# Patient Record
Sex: FEMALE | Race: WHITE | NOT HISPANIC OR LATINO | Employment: FULL TIME | URBAN - METROPOLITAN AREA
[De-identification: names, ages, dates, MRNs, and addresses within clinical notes are randomized per-mention and may not be internally consistent; named-entity substitution may affect disease eponyms.]

---

## 2018-06-01 ENCOUNTER — HOSPITAL ENCOUNTER (OUTPATIENT)
Dept: URGENT CARE | Facility: CLINIC | Age: 23
Setting detail: SPECIMEN
Discharge: HOME | End: 2018-06-01
Attending: FAMILY MEDICINE | Admitting: FAMILY MEDICINE

## 2018-06-01 LAB
ALBUMIN SERPL-MCNC: 4.4 G/DL (ref 3.5–4.8)
ALBUMIN/GLOB SERPL: 2.3 {RATIO} (ref 1–1.7)
ALP SERPL-CCNC: 24 IU/L (ref 32–91)
ALT SERPL-CCNC: 16 IU/L (ref 14–54)
ANION GAP SERPL CALC-SCNC: 14.3 MMOL/L (ref 10–20)
AST SERPL-CCNC: 23 IU/L (ref 15–41)
BILIRUB SERPL-MCNC: 0.9 MG/DL (ref 0.3–1.2)
BUN SERPL-MCNC: 12 MG/DL (ref 8–20)
BUN/CREAT SERPL: 13.3 (ref 5.4–26.2)
CALCIUM SERPL-MCNC: 9.8 MG/DL (ref 8.9–10.3)
CHLORIDE SERPL-SCNC: 103 MMOL/L (ref 101–111)
CONV CO2: 24 MMOL/L (ref 22–32)
CONV TOTAL PROTEIN: 6.3 G/DL (ref 6.1–7.9)
CREAT UR-MCNC: 0.9 MG/DL (ref 0.4–1)
GLOBULIN UR ELPH-MCNC: 1.9 G/DL (ref 2.5–3.8)
GLUCOSE SERPL-MCNC: 108 MG/DL (ref 65–99)
POTASSIUM SERPL-SCNC: 4.3 MMOL/L (ref 3.6–5.1)
SODIUM SERPL-SCNC: 137 MMOL/L (ref 136–144)

## 2018-06-04 LAB — HAV IGM SERPL QL IA: NONREACTIVE

## 2019-09-22 RX ORDER — MONTELUKAST SODIUM 10 MG/1
TABLET ORAL
Qty: 90 TABLET | Refills: 0 | Status: SHIPPED | OUTPATIENT
Start: 2019-09-22 | End: 2019-12-17 | Stop reason: SDUPTHER

## 2019-12-17 RX ORDER — MONTELUKAST SODIUM 10 MG/1
TABLET ORAL
Qty: 90 TABLET | Refills: 0 | Status: SHIPPED | OUTPATIENT
Start: 2019-12-17

## 2020-03-16 RX ORDER — MONTELUKAST SODIUM 10 MG/1
TABLET ORAL
Qty: 90 TABLET | Refills: 0 | OUTPATIENT
Start: 2020-03-16

## 2020-03-22 RX ORDER — FLUTICASONE PROPIONATE 50 MCG
SPRAY, SUSPENSION (ML) NASAL
Qty: 48 G | Refills: 3 | Status: SHIPPED | OUTPATIENT
Start: 2020-03-22

## 2020-11-18 LAB
EXTERNAL ABO GROUPING: NORMAL
EXTERNAL ANTIBODY SCREEN: NEGATIVE
EXTERNAL HEPATITIS B SURFACE ANTIGEN: NEGATIVE
EXTERNAL HEPATITIS C AB: NORMAL
EXTERNAL RH FACTOR: POSITIVE
EXTERNAL RUBELLA QUALITATIVE: NORMAL
EXTERNAL SYPHILIS RPR SCREEN: NORMAL
HIV1 P24 AG SERPL QL IA: NORMAL

## 2021-06-24 LAB — EXTERNAL GROUP B STREP ANTIGEN: NEGATIVE

## 2021-07-18 ENCOUNTER — HOSPITAL ENCOUNTER (INPATIENT)
Facility: HOSPITAL | Age: 26
LOS: 4 days | Discharge: HOME | End: 2021-07-22
Attending: OBSTETRICS & GYNECOLOGY | Admitting: OBSTETRICS & GYNECOLOGY

## 2021-07-18 ENCOUNTER — HOSPITAL ENCOUNTER (OUTPATIENT)
Dept: LABOR AND DELIVERY | Facility: HOSPITAL | Age: 26
Discharge: HOME | End: 2021-07-18

## 2021-07-18 PROBLEM — Z34.90 PREGNANT: Status: ACTIVE | Noted: 2021-07-18

## 2021-07-18 LAB
ABO GROUP BLD: NORMAL
BLD GP AB SCN SERPL QL: NEGATIVE
DEPRECATED RDW RBC AUTO: 42 FL (ref 37–54)
ERYTHROCYTE [DISTWIDTH] IN BLOOD BY AUTOMATED COUNT: 16 % (ref 12.3–15.4)
HCT VFR BLD AUTO: 32.1 % (ref 34–46.6)
HGB BLD-MCNC: 10.6 G/DL (ref 12–15.9)
MCH RBC QN AUTO: 24.6 PG (ref 26.6–33)
MCHC RBC AUTO-ENTMCNC: 33 G/DL (ref 31.5–35.7)
MCV RBC AUTO: 74.5 FL (ref 79–97)
PLATELET # BLD AUTO: 188 10*3/MM3 (ref 140–450)
PMV BLD AUTO: 8 FL (ref 6–12)
RBC # BLD AUTO: 4.31 10*6/MM3 (ref 3.77–5.28)
RH BLD: POSITIVE
SARS-COV-2 RNA PNL SPEC NAA+PROBE: NOT DETECTED
T&S EXPIRATION DATE: NORMAL
WBC # BLD AUTO: 8.4 10*3/MM3 (ref 3.4–10.8)

## 2021-07-18 PROCEDURE — 86901 BLOOD TYPING SEROLOGIC RH(D): CPT | Performed by: OBSTETRICS & GYNECOLOGY

## 2021-07-18 PROCEDURE — 86900 BLOOD TYPING SEROLOGIC ABO: CPT

## 2021-07-18 PROCEDURE — 86850 RBC ANTIBODY SCREEN: CPT | Performed by: OBSTETRICS & GYNECOLOGY

## 2021-07-18 PROCEDURE — 86592 SYPHILIS TEST NON-TREP QUAL: CPT | Performed by: OBSTETRICS & GYNECOLOGY

## 2021-07-18 PROCEDURE — 86900 BLOOD TYPING SEROLOGIC ABO: CPT | Performed by: OBSTETRICS & GYNECOLOGY

## 2021-07-18 PROCEDURE — 85027 COMPLETE CBC AUTOMATED: CPT | Performed by: OBSTETRICS & GYNECOLOGY

## 2021-07-18 PROCEDURE — 86901 BLOOD TYPING SEROLOGIC RH(D): CPT

## 2021-07-18 PROCEDURE — 87635 SARS-COV-2 COVID-19 AMP PRB: CPT | Performed by: OBSTETRICS & GYNECOLOGY

## 2021-07-18 RX ORDER — SODIUM CHLORIDE, SODIUM LACTATE, POTASSIUM CHLORIDE, CALCIUM CHLORIDE 600; 310; 30; 20 MG/100ML; MG/100ML; MG/100ML; MG/100ML
125 INJECTION, SOLUTION INTRAVENOUS CONTINUOUS
Status: DISCONTINUED | OUTPATIENT
Start: 2021-07-18 | End: 2021-07-19

## 2021-07-18 RX ORDER — SODIUM CHLORIDE 0.9 % (FLUSH) 0.9 %
10 SYRINGE (ML) INJECTION EVERY 12 HOURS SCHEDULED
Status: DISCONTINUED | OUTPATIENT
Start: 2021-07-18 | End: 2021-07-19 | Stop reason: HOSPADM

## 2021-07-18 RX ORDER — MAGNESIUM CARB/ALUMINUM HYDROX 105-160MG
30 TABLET,CHEWABLE ORAL DAILY PRN
Status: DISCONTINUED | OUTPATIENT
Start: 2021-07-19 | End: 2021-07-19 | Stop reason: HOSPADM

## 2021-07-18 RX ORDER — LEVOMEFOLATE CALCIUM 15 MG
15 TABLET ORAL EVERY OTHER DAY
COMMUNITY
End: 2021-07-22 | Stop reason: HOSPADM

## 2021-07-18 RX ORDER — OXYTOCIN-SODIUM CHLORIDE 0.9% IV SOLN 30 UNIT/500ML 30-0.9/5 UT/ML-%
2 SOLUTION INTRAVENOUS
Status: DISCONTINUED | OUTPATIENT
Start: 2021-07-19 | End: 2021-07-19

## 2021-07-18 RX ORDER — LIDOCAINE HYDROCHLORIDE 10 MG/ML
30 INJECTION, SOLUTION INFILTRATION; PERINEURAL AS NEEDED
Status: DISCONTINUED | OUTPATIENT
Start: 2021-07-18 | End: 2021-07-19 | Stop reason: HOSPADM

## 2021-07-18 RX ORDER — PRENATAL VIT/IRON FUM/FOLIC AC 27MG-0.8MG
TABLET ORAL DAILY
Status: ON HOLD | COMMUNITY
End: 2021-07-18

## 2021-07-18 RX ORDER — MAGNESIUM CARB/ALUMINUM HYDROX 105-160MG
30 TABLET,CHEWABLE ORAL AS NEEDED
Status: DISCONTINUED | OUTPATIENT
Start: 2021-07-18 | End: 2021-07-18

## 2021-07-18 RX ORDER — MORPHINE SULFATE 4 MG/ML
4 INJECTION, SOLUTION INTRAMUSCULAR; INTRAVENOUS
Status: DISCONTINUED | OUTPATIENT
Start: 2021-07-18 | End: 2021-07-19 | Stop reason: HOSPADM

## 2021-07-18 RX ORDER — PRENATAL VIT NO.126/IRON/FOLIC 28MG-0.8MG
1 TABLET ORAL DAILY
COMMUNITY

## 2021-07-18 RX ORDER — ALBUTEROL SULFATE 90 UG/1
2 AEROSOL, METERED RESPIRATORY (INHALATION) AS NEEDED
COMMUNITY

## 2021-07-18 RX ORDER — MAGNESIUM CARB/ALUMINUM HYDROX 105-160MG
30 TABLET,CHEWABLE ORAL ONCE
Status: DISCONTINUED | OUTPATIENT
Start: 2021-07-18 | End: 2021-07-18

## 2021-07-18 RX ADMIN — Medication 10 ML: at 20:34

## 2021-07-18 RX ADMIN — DINOPROSTONE 10 MG: 10 INSERT VAGINAL at 20:00

## 2021-07-18 RX ADMIN — SODIUM CHLORIDE, POTASSIUM CHLORIDE, SODIUM LACTATE AND CALCIUM CHLORIDE 1000 ML: 600; 310; 30; 20 INJECTION, SOLUTION INTRAVENOUS at 19:27

## 2021-07-18 RX ADMIN — SODIUM CHLORIDE, POTASSIUM CHLORIDE, SODIUM LACTATE AND CALCIUM CHLORIDE 125 ML/HR: 600; 310; 30; 20 INJECTION, SOLUTION INTRAVENOUS at 23:06

## 2021-07-18 NOTE — PLAN OF CARE
Goal Outcome Evaluation:  Plan of Care Reviewed With: patient, spouse        Progress: no change  Outcome Summary: pt admitted for cervidil induction. cat1 fetal tracing.

## 2021-07-19 ENCOUNTER — ANESTHESIA EVENT (OUTPATIENT)
Dept: LABOR AND DELIVERY | Facility: HOSPITAL | Age: 26
End: 2021-07-19

## 2021-07-19 ENCOUNTER — ANESTHESIA (OUTPATIENT)
Dept: LABOR AND DELIVERY | Facility: HOSPITAL | Age: 26
End: 2021-07-19

## 2021-07-19 LAB — RPR SER QL: NORMAL

## 2021-07-19 PROCEDURE — 25010000002 ONDANSETRON PER 1 MG: Performed by: ANESTHESIOLOGY

## 2021-07-19 PROCEDURE — 88307 TISSUE EXAM BY PATHOLOGIST: CPT | Performed by: OBSTETRICS & GYNECOLOGY

## 2021-07-19 PROCEDURE — 25010000002 METOCLOPRAMIDE PER 10 MG: Performed by: ANESTHESIOLOGY

## 2021-07-19 PROCEDURE — 25010000002 FENTANYL CITRATE (PF) 250 MCG/5ML SOLUTION: Performed by: ANESTHESIOLOGY

## 2021-07-19 PROCEDURE — 25010000002 MORPHINE PER 10 MG: Performed by: ANESTHESIOLOGY

## 2021-07-19 PROCEDURE — 25010000002 PHENYLEPHRINE 10 MG/ML SOLUTION: Performed by: ANESTHESIOLOGY

## 2021-07-19 RX ORDER — DIPHENHYDRAMINE HYDROCHLORIDE 50 MG/ML
25 INJECTION INTRAMUSCULAR; INTRAVENOUS EVERY 4 HOURS PRN
Status: DISCONTINUED | OUTPATIENT
Start: 2021-07-19 | End: 2021-07-22 | Stop reason: HOSPADM

## 2021-07-19 RX ORDER — ONDANSETRON 2 MG/ML
4 INJECTION INTRAMUSCULAR; INTRAVENOUS ONCE AS NEEDED
Status: DISPENSED | OUTPATIENT
Start: 2021-07-19 | End: 2021-07-20

## 2021-07-19 RX ORDER — HYDROMORPHONE HCL 110MG/55ML
0.5 PATIENT CONTROLLED ANALGESIA SYRINGE INTRAVENOUS
Status: ACTIVE | OUTPATIENT
Start: 2021-07-19 | End: 2021-07-20

## 2021-07-19 RX ORDER — DIPHENHYDRAMINE HCL 25 MG
25 CAPSULE ORAL EVERY 4 HOURS PRN
Status: DISCONTINUED | OUTPATIENT
Start: 2021-07-19 | End: 2021-07-22 | Stop reason: HOSPADM

## 2021-07-19 RX ORDER — ONDANSETRON 4 MG/1
4 TABLET, FILM COATED ORAL EVERY 8 HOURS PRN
Status: DISCONTINUED | OUTPATIENT
Start: 2021-07-19 | End: 2021-07-22 | Stop reason: HOSPADM

## 2021-07-19 RX ORDER — LANOLIN 100 %
OINTMENT (GRAM) TOPICAL
Status: DISCONTINUED | OUTPATIENT
Start: 2021-07-19 | End: 2021-07-22 | Stop reason: HOSPADM

## 2021-07-19 RX ORDER — IBUPROFEN 600 MG/1
600 TABLET ORAL EVERY 6 HOURS PRN
Status: DISCONTINUED | OUTPATIENT
Start: 2021-07-19 | End: 2021-07-22 | Stop reason: HOSPADM

## 2021-07-19 RX ORDER — PRENATAL VIT/IRON FUM/FOLIC AC 27MG-0.8MG
1 TABLET ORAL DAILY
Status: DISCONTINUED | OUTPATIENT
Start: 2021-07-19 | End: 2021-07-22 | Stop reason: HOSPADM

## 2021-07-19 RX ORDER — CARBOPROST TROMETHAMINE 250 UG/ML
250 INJECTION, SOLUTION INTRAMUSCULAR AS NEEDED
Status: DISCONTINUED | OUTPATIENT
Start: 2021-07-19 | End: 2021-07-19 | Stop reason: HOSPADM

## 2021-07-19 RX ORDER — OXYTOCIN-SODIUM CHLORIDE 0.9% IV SOLN 30 UNIT/500ML 30-0.9/5 UT/ML-%
125 SOLUTION INTRAVENOUS CONTINUOUS PRN
Status: COMPLETED | OUTPATIENT
Start: 2021-07-19 | End: 2021-07-19

## 2021-07-19 RX ORDER — NALOXONE HCL 0.4 MG/ML
0.4 VIAL (ML) INJECTION
Status: ACTIVE | OUTPATIENT
Start: 2021-07-19 | End: 2021-07-20

## 2021-07-19 RX ORDER — OXYCODONE HYDROCHLORIDE 5 MG/1
5 TABLET ORAL EVERY 4 HOURS PRN
Status: DISCONTINUED | OUTPATIENT
Start: 2021-07-19 | End: 2021-07-22 | Stop reason: HOSPADM

## 2021-07-19 RX ORDER — SCOLOPAMINE TRANSDERMAL SYSTEM 1 MG/1
1 PATCH, EXTENDED RELEASE TRANSDERMAL
Status: DISCONTINUED | OUTPATIENT
Start: 2021-07-19 | End: 2021-07-22 | Stop reason: HOSPADM

## 2021-07-19 RX ORDER — OXYTOCIN-SODIUM CHLORIDE 0.9% IV SOLN 30 UNIT/500ML 30-0.9/5 UT/ML-%
2 SOLUTION INTRAVENOUS
Status: DISCONTINUED | OUTPATIENT
Start: 2021-07-19 | End: 2021-07-19

## 2021-07-19 RX ORDER — CLINDAMYCIN PHOSPHATE 900 MG/50ML
900 INJECTION, SOLUTION INTRAVENOUS ONCE
Status: COMPLETED | OUTPATIENT
Start: 2021-07-19 | End: 2021-07-19

## 2021-07-19 RX ORDER — CLINDAMYCIN PHOSPHATE 600 MG/50ML
600 INJECTION, SOLUTION INTRAVENOUS ONCE
Status: DISCONTINUED | OUTPATIENT
Start: 2021-07-19 | End: 2021-07-19

## 2021-07-19 RX ORDER — SODIUM CHLORIDE, SODIUM LACTATE, POTASSIUM CHLORIDE, CALCIUM CHLORIDE 600; 310; 30; 20 MG/100ML; MG/100ML; MG/100ML; MG/100ML
125 INJECTION, SOLUTION INTRAVENOUS CONTINUOUS
Status: DISCONTINUED | OUTPATIENT
Start: 2021-07-19 | End: 2021-07-22 | Stop reason: HOSPADM

## 2021-07-19 RX ORDER — PHENYLEPHRINE HYDROCHLORIDE 10 MG/ML
INJECTION INTRAVENOUS AS NEEDED
Status: DISCONTINUED | OUTPATIENT
Start: 2021-07-19 | End: 2021-07-19 | Stop reason: SURG

## 2021-07-19 RX ORDER — METOCLOPRAMIDE HYDROCHLORIDE 5 MG/ML
INJECTION INTRAMUSCULAR; INTRAVENOUS AS NEEDED
Status: DISCONTINUED | OUTPATIENT
Start: 2021-07-19 | End: 2021-07-19 | Stop reason: SURG

## 2021-07-19 RX ORDER — OXYTOCIN-SODIUM CHLORIDE 0.9% IV SOLN 30 UNIT/500ML 30-0.9/5 UT/ML-%
125 SOLUTION INTRAVENOUS CONTINUOUS PRN
Status: DISCONTINUED | OUTPATIENT
Start: 2021-07-19 | End: 2021-07-22 | Stop reason: HOSPADM

## 2021-07-19 RX ORDER — MONTELUKAST SODIUM 10 MG/1
10 TABLET ORAL EVERY EVENING
Status: DISCONTINUED | OUTPATIENT
Start: 2021-07-19 | End: 2021-07-22 | Stop reason: HOSPADM

## 2021-07-19 RX ORDER — OXYTOCIN-SODIUM CHLORIDE 0.9% IV SOLN 30 UNIT/500ML 30-0.9/5 UT/ML-%
250 SOLUTION INTRAVENOUS CONTINUOUS
Status: ACTIVE | OUTPATIENT
Start: 2021-07-19 | End: 2021-07-19

## 2021-07-19 RX ORDER — FENTANYL CITRATE 50 UG/ML
INJECTION, SOLUTION INTRAMUSCULAR; INTRAVENOUS
Status: COMPLETED | OUTPATIENT
Start: 2021-07-19 | End: 2021-07-19

## 2021-07-19 RX ORDER — METHYLERGONOVINE MALEATE 0.2 MG/ML
200 INJECTION INTRAVENOUS ONCE AS NEEDED
Status: DISCONTINUED | OUTPATIENT
Start: 2021-07-19 | End: 2021-07-19 | Stop reason: HOSPADM

## 2021-07-19 RX ORDER — MISOPROSTOL 200 UG/1
800 TABLET ORAL AS NEEDED
Status: DISCONTINUED | OUTPATIENT
Start: 2021-07-19 | End: 2021-07-19 | Stop reason: HOSPADM

## 2021-07-19 RX ORDER — BUPIVACAINE HYDROCHLORIDE 7.5 MG/ML
INJECTION, SOLUTION EPIDURAL; RETROBULBAR
Status: COMPLETED | OUTPATIENT
Start: 2021-07-19 | End: 2021-07-19

## 2021-07-19 RX ORDER — ONDANSETRON 2 MG/ML
INJECTION INTRAMUSCULAR; INTRAVENOUS AS NEEDED
Status: DISCONTINUED | OUTPATIENT
Start: 2021-07-19 | End: 2021-07-19 | Stop reason: SURG

## 2021-07-19 RX ORDER — BUTORPHANOL TARTRATE 1 MG/ML
0.5 INJECTION, SOLUTION INTRAMUSCULAR; INTRAVENOUS EVERY 6 HOURS PRN
Status: DISCONTINUED | OUTPATIENT
Start: 2021-07-19 | End: 2021-07-22 | Stop reason: HOSPADM

## 2021-07-19 RX ORDER — OXYTOCIN-SODIUM CHLORIDE 0.9% IV SOLN 30 UNIT/500ML 30-0.9/5 UT/ML-%
999 SOLUTION INTRAVENOUS ONCE
Status: COMPLETED | OUTPATIENT
Start: 2021-07-19 | End: 2021-07-19

## 2021-07-19 RX ORDER — TRISODIUM CITRATE DIHYDRATE AND CITRIC ACID MONOHYDRATE 500; 334 MG/5ML; MG/5ML
30 SOLUTION ORAL ONCE
Status: COMPLETED | OUTPATIENT
Start: 2021-07-19 | End: 2021-07-19

## 2021-07-19 RX ORDER — OXYTOCIN 10 [USP'U]/ML
INJECTION, SOLUTION INTRAMUSCULAR; INTRAVENOUS AS NEEDED
Status: DISCONTINUED | OUTPATIENT
Start: 2021-07-19 | End: 2021-07-19 | Stop reason: SURG

## 2021-07-19 RX ORDER — HYDROXYZINE HYDROCHLORIDE 25 MG/1
50 TABLET, FILM COATED ORAL EVERY 6 HOURS PRN
Status: DISCONTINUED | OUTPATIENT
Start: 2021-07-19 | End: 2021-07-22 | Stop reason: HOSPADM

## 2021-07-19 RX ORDER — OXYCODONE HYDROCHLORIDE 5 MG/1
10 TABLET ORAL EVERY 4 HOURS PRN
Status: DISCONTINUED | OUTPATIENT
Start: 2021-07-19 | End: 2021-07-22 | Stop reason: HOSPADM

## 2021-07-19 RX ORDER — MORPHINE SULFATE 1 MG/ML
INJECTION, SOLUTION EPIDURAL; INTRATHECAL; INTRAVENOUS
Status: COMPLETED | OUTPATIENT
Start: 2021-07-19 | End: 2021-07-19

## 2021-07-19 RX ORDER — OXYTOCIN-SODIUM CHLORIDE 0.9% IV SOLN 30 UNIT/500ML 30-0.9/5 UT/ML-%
999 SOLUTION INTRAVENOUS ONCE
Status: DISCONTINUED | OUTPATIENT
Start: 2021-07-19 | End: 2021-07-22 | Stop reason: HOSPADM

## 2021-07-19 RX ADMIN — SODIUM CHLORIDE, POTASSIUM CHLORIDE, SODIUM LACTATE AND CALCIUM CHLORIDE 125 ML/HR: 600; 310; 30; 20 INJECTION, SOLUTION INTRAVENOUS at 08:40

## 2021-07-19 RX ADMIN — SODIUM CHLORIDE, POTASSIUM CHLORIDE, SODIUM LACTATE AND CALCIUM CHLORIDE: 600; 310; 30; 20 INJECTION, SOLUTION INTRAVENOUS at 10:30

## 2021-07-19 RX ADMIN — METOCLOPRAMIDE HYDROCHLORIDE 5 MG: 5 INJECTION INTRAMUSCULAR; INTRAVENOUS at 10:42

## 2021-07-19 RX ADMIN — SODIUM CHLORIDE, POTASSIUM CHLORIDE, SODIUM LACTATE AND CALCIUM CHLORIDE 125 ML/HR: 600; 310; 30; 20 INJECTION, SOLUTION INTRAVENOUS at 17:42

## 2021-07-19 RX ADMIN — PHENYLEPHRINE HYDROCHLORIDE 50 MCG: 10 INJECTION INTRAVENOUS at 10:28

## 2021-07-19 RX ADMIN — OXYTOCIN 125 ML/HR: 10 INJECTION INTRAVENOUS at 13:24

## 2021-07-19 RX ADMIN — OXYTOCIN 999 ML/HR: 10 INJECTION INTRAVENOUS at 12:14

## 2021-07-19 RX ADMIN — SODIUM CHLORIDE, POTASSIUM CHLORIDE, SODIUM LACTATE AND CALCIUM CHLORIDE 125 ML/HR: 600; 310; 30; 20 INJECTION, SOLUTION INTRAVENOUS at 07:06

## 2021-07-19 RX ADMIN — MONTELUKAST 10 MG: 10 TABLET, FILM COATED ORAL at 20:59

## 2021-07-19 RX ADMIN — FENTANYL CITRATE 15 MCG: 0.05 INJECTION, SOLUTION INTRAMUSCULAR; INTRAVENOUS at 10:12

## 2021-07-19 RX ADMIN — BUPIVACAINE HYDROCHLORIDE 1.6 ML: 7.5 INJECTION, SOLUTION EPIDURAL; RETROBULBAR at 10:12

## 2021-07-19 RX ADMIN — ONDANSETRON 4 MG: 2 INJECTION INTRAMUSCULAR; INTRAVENOUS at 10:39

## 2021-07-19 RX ADMIN — PHENYLEPHRINE HYDROCHLORIDE 100 MCG: 10 INJECTION INTRAVENOUS at 10:20

## 2021-07-19 RX ADMIN — OXYTOCIN 30 UNITS: 10 INJECTION INTRAVENOUS at 10:30

## 2021-07-19 RX ADMIN — SCOLOPAMINE TRANSDERMAL SYSTEM 1 PATCH: 1 PATCH, EXTENDED RELEASE TRANSDERMAL at 17:42

## 2021-07-19 RX ADMIN — PHENYLEPHRINE HYDROCHLORIDE 50 MCG: 10 INJECTION INTRAVENOUS at 10:49

## 2021-07-19 RX ADMIN — SODIUM CITRATE AND CITRIC ACID MONOHYDRATE 30 ML: 500; 334 SOLUTION ORAL at 09:36

## 2021-07-19 RX ADMIN — OXYCODONE HYDROCHLORIDE 10 MG: 5 TABLET ORAL at 17:37

## 2021-07-19 RX ADMIN — PHENYLEPHRINE HYDROCHLORIDE 50 MCG: 10 INJECTION INTRAVENOUS at 10:42

## 2021-07-19 RX ADMIN — OXYTOCIN 2 MILLI-UNITS/MIN: 10 INJECTION INTRAVENOUS at 05:00

## 2021-07-19 RX ADMIN — MORPHINE SULFATE 0.25 MG: 1 INJECTION, SOLUTION EPIDURAL; INTRATHECAL; INTRAVENOUS at 10:12

## 2021-07-19 RX ADMIN — PHENYLEPHRINE HYDROCHLORIDE 50 MCG: 10 INJECTION INTRAVENOUS at 10:41

## 2021-07-19 RX ADMIN — CLINDAMYCIN PHOSPHATE 900 MG: 900 INJECTION, SOLUTION INTRAVENOUS at 09:36

## 2021-07-19 RX ADMIN — ONDANSETRON 4 MG: 2 INJECTION INTRAMUSCULAR; INTRAVENOUS at 14:49

## 2021-07-19 NOTE — L&D DELIVERY NOTE
[facility]   Section Operative Note    Pre-Operative Dx:   1.  Patient is a 26 y.o.  at 39w6d    2. fetal distress      Post-Operative Dx:    1.  Same     Procedure: Primary    Surgeon:  Assistant: Mehnaz Fields MD   Anesthesia:  Anesthesiologist:  Spinal  Dr Carrizales     EBL:  800cc     Antibiotics: cefazolin (Ancef)     Infant    Findings: LVMI, Sincere  AB.385/3.1  VB.396/0       Apgars:    8  @ 1 minute /   9  @ 5 minutes          Procedure Details:     Pt was taken to the OR where she was prepped and draped in the usual sterile fashion with a catheter and a left tilt.  Anesthesia was found to be adequate.    A Pfannenstiel skin incision was made with the scalpel and carried through to the underlying layer of fascia with the scalpel.  The fascial incision was extended laterally with the Lim scissors and  from the underlying rectus muscles superiorly and inferiorly.  The rectus muscles were  in the midline and the peritoneum was entered bluntly.  The peritoneal incision was extended laterally and the Clive retractor was placed.  The bladder flap was created sharply.    A low transverse uterine incision was made with the scalpel and extended manually.    The infant's head, shoulders, and body were delivered without difficulty.  Nose and mouth were bulb suctioned.  The cord was clamped and cut.  The infant was shown to the parents then handed to awaiting nurse with good cry, color, tone, and movement of all extremities.   Cord gasses and blood were obtained.   Placenta was delivered spontaneously intact with a three vessel cord.    The uterus was exteriorized and cleared of all clots and debris.    The uterine incision was repaired with 0 Monocryl in a running, locked fashion and a second layer of 0 Monocryl was used and excellent hemostasis was achieved.   The posterior cul de sac was irrigated.   The uterus was returned to the abdomen, the gutters were cleared  of all clots and debris.  The uterine incision was examined and was hemostatic.     The peritoneum was reapproximated with 3.0 Monocryl in a running fashion.  The rectus muscles were reapproximated with 0 Monocryl in several simple interrupted sutures.    The fascia was closed with 0 Vicryl in a running fashion.    The subcutaneous space was irrigated and closed with 3.0 Monocryl.    The skin was closed with 4.0 vicryl in a subcuticular fashion.  Sponge, lap, needle and instrument counts were correct x 2.        Complications:   None      Disposition:   Recovery room then postpartum.          Mehnaz Fields MD  7/19/2021  11:04 EDT

## 2021-07-19 NOTE — ANESTHESIA PREPROCEDURE EVALUATION
Anesthesia Evaluation     Patient summary reviewed   NPO Solid Status: > 8 hours  NPO Liquid Status: > 8 hours           Airway   Mallampati: II  TM distance: >3 FB  Neck ROM: full  No difficulty expected  Dental - normal exam     Pulmonary - normal exam   (+) asthma,  Cardiovascular - normal exam  Exercise tolerance: good (4-7 METS)        Neuro/Psych  GI/Hepatic/Renal/Endo      Musculoskeletal     Abdominal  - normal exam    Bowel sounds: normal.   Substance History      OB/GYN    (+) Pregnant,         Other                        Anesthesia Plan    ASA 2     spinal       Anesthetic plan, all risks, benefits, and alternatives have been provided, discussed and informed consent has been obtained with: patient.  Use of blood products discussed with patient  Consented to blood products.

## 2021-07-19 NOTE — PAYOR COMM NOTE
"This is initial clinical for Melissa taj Akins auth# [id number]  AUTHORIZATION PENDING:   Please call or fax determination to contact below.   Thank you.    Shayna Hendrickson RN, BSN  Utilization Review Nurse  [facility]  Direct & confidential phone # [phone]  Fax # [phone]  ================    Verified inpatient order: 21---fetal distress noted, delivered via  on 21     Meets inpatient criteria per MCG guidelines:   Delivery  ORG: S-350 ([facility])   Operative Status Criteria  Inpatient  ==================  Delivery Record:     Delivery date and time:     Gestational age:   39+6  wks.     /Para/Ab:      Sex: Male     Birth weight:  3924 gms.     Birth length: 21  inches     Apgars:8/9     Delivery type:   =================      Melissa Bridges (26 y.o. Female)     Date of Birth Social Security Number Address Home Phone MRN    [id number]  [address] [place] [address]  [place] [facility] [phone] [id number]    Jainism Marital Status          None        Admission Date Admission Type Admitting Provider Attending Provider Department, Room/Bed    21 Elective Mehnaz Fiedls MD Allen, Melissa Jarles, MD [facility] MOTHER BABY, M411/    Discharge Date Discharge Disposition Discharge Destination                       Attending Provider: Mehnaz Fields MD    Allergies: Amoxicillin, Gluten Meal, Lactose Intolerance (Gi), Milk-related Compounds    Isolation: None   Infection: None   Code Status: CPR    Ht: 162.6 cm (64\")   Wt: 84.5 kg (186 lb 4.6 oz)    Admission Cmt: None   Principal Problem: None                Active Insurance as of 2021     Primary Coverage     Payor Plan Insurance Group Employer/Plan Group    ANTHEM BLUE CROSS ANTHEM BLUE CROSS BLUE SHIELD PPO [id number]     Payor Plan Address Payor Plan Phone Number Payor Plan Fax Number Effective Dates    [address] [phone]  2016 - None Entered    [facility] [address]       Subscriber Name Subscriber Birth Date Member ID       ANIYAH CHAMPAGNE 1994 [id number]                 Emergency Contacts      (Rel.) Home Phone Work Phone Mobile Phone    aniyah champagne (Spouse) -- -- [phone]               History & Physical      Donnie, Mehnaz Pereyra MD at 21 0758          [facility]  Obstetric History and Physical     Chief Complaint: IOL at term    Subjective     Patient is a 26 y.o. female  currently at 39w6d, who presents for IOL at term.     Her prenatal care is c/b anemia, abnormal 1 hour-unable to complete 3 hour. She checked glucose for 2 weeks with all normal values.      Prenatal Information:  External Prenatal Results     Pregnancy Outside Results - Transcribed From Office Records - See Scanned Records For Details     Test Value Date Time    ABO  A  21 185    Rh  Positive  21 1852    Antibody Screen  Negative  21 185      ^ Negative  20     Varicella IgG       Rubella ^ Immune  20     Hgb  10.6 g/dL 21 1852    Hct  32.1 % 21 185    Glucose Fasting GTT       Glucose Tolerance Test 1 hour       Glucose Tolerance Test 3 hour       Gonorrhea (discrete)       Chlamydia (discrete)       RPR ^ Non-Reactive  20     VDRL       Syphilis Antibody       HBsAg ^ Negative  20     Herpes Simplex Virus PCR       Herpes Simplex VIrus Culture       HIV ^ Non-Reactive  20     Hep C RNA Quant PCR       Hep C Antibody ^ neg  20     AFP       Group B Strep ^ Negative  21     GBS Susceptibility to Clindamycin       GBS Susceptibility to Erythromycin       Fetal Fibronectin       Genetic Testing, Maternal Blood             Drug Screening     Test Value Date Time    Urine Drug Screen       Amphetamine Screen       Barbiturate Screen       Benzodiazepine Screen       Methadone Screen       Phencyclidine Screen       Opiates Screen        THC Screen       Cocaine Screen       Propoxyphene Screen       Buprenorphine Screen       Methamphetamine Screen       Oxycodone Screen       Tricyclic Antidepressants Screen             Legend    ^: Historical                         Past OB History:    none       Past Medical History: Past Medical History:   Diagnosis Date   • Asthma     exercise induced   • Celiac disease    • Eczema    • Irritable bowel syndrome    • Yeast infection of the vagina         Past Surgical History Past Surgical History:   Procedure Laterality Date   • WISDOM TOOTH EXTRACTION  2009        Family History: No family history on file.   Social History:  reports that she has never smoked. She has never used smokeless tobacco.   reports previous alcohol use.   reports no history of drug use.        General ROS: Pertinent items are noted in HPI    Objective      Vitals:     Vitals:    07/19/21 0600 07/19/21 0630 07/19/21 0700 07/19/21 0721   BP: 118/73 113/71 120/77    Pulse: 85 82 76 72   Resp:  16 16    Temp:    98.2 °F (36.8 °C)   TempSrc:    Oral   SpO2:    99%   Weight:       Height:           Fetal Heart Rate Assessment:   140, mod variability; several decels to 60's-70's with cervidil and pitocin after getting up to the bathroom or repositioning in the bed    [place]:   Every 2-3 min     Physical Exam:     General Appearance:    Alert, cooperative, in no acute distress   Abdomen:     Soft, non-tender, EFW 7 lbs   Pelvic Exam:    Presentation: vtx    Cervix: was checked (by me): 1-2 cm / 50% % / -2, pelvis clinically adequate   Extremities:   Moves all extremities well   Skin:   No bleeding, bruising or rash         Laboratory Results:   Lab Results (last 48 hours)     Procedure Component Value Units Date/Time    COVID PRE-OP / PRE-PROCEDURE SCREENING ORDER (NO ISOLATION) - Swab, Nasopharynx [[id number]]  (Normal) Collected: 07/18/21 1854    Specimen: Swab from Nasopharynx Updated: 07/18/21 1937    Narrative:      The following  orders were created for panel order COVID PRE-OP / PRE-PROCEDURE SCREENING ORDER (NO ISOLATION) - Swab, Nasopharynx.  Procedure                               Abnormality         Status                     ---------                               -----------         ------                     COVID-19,CEPHEID,COR/LUIS...[[id number]]  Normal              Final result                 Please view results for these tests on the individual orders.    COVID-19,CEPHEID,COR/LUIS/PAD/SUSU IN-HOUSE(OR EMERGENT/ADD-ON),NP SWAB IN TRANSPORT MEDIA 3-4 HR TAT, RT-PCR - Swab, Nasopharynx [[id number]]  (Normal) Collected: 07/18/21 1854    Specimen: Swab from Nasopharynx Updated: 07/18/21 1937     COVID19 Not Detected    Narrative:      Fact sheet for providers: https://www.fda.gov/media/342784/download     Fact sheet for patients: https://www.fda.gov/media/591551/download  Fact sheet for providers: https://www.fda.gov/media/290892/download    Fact sheet for patients: https://www.fda.gov/media/097487/download    Test performed by PCR.    CBC (No Diff) [[id number]]  (Abnormal) Collected: 07/18/21 1852    Specimen: Blood Updated: 07/18/21 1922     WBC 8.40 10*3/mm3      RBC 4.31 10*6/mm3      Hemoglobin 10.6 g/dL      Hematocrit 32.1 %      MCV 74.5 fL      MCH 24.6 pg      MCHC 33.0 g/dL      RDW 16.0 %      RDW-SD 42.0 fl      MPV 8.0 fL      Platelets 188 10*3/mm3     RPR [[id number]] Collected: 07/18/21 1852    Specimen: Blood Updated: 07/18/21 1915    HIV-1 Antibody, EIA [[id number]] Resulted: 11/18/20    Specimen: Blood Updated: 07/18/21 1749     External HIV Antibody Non-Reactive    Hepatitis C Antibody [[id number]] Resulted: 11/18/20    Specimen: Blood Updated: 07/18/21 1749     External Hepatitis C Ab neg    Hepatitis B Surface Antigen [[id number]] Resulted: 11/18/20    Specimen: Blood Updated: 07/18/21 1749     External Hepatitis B Surface Ag Negative    RPR [[id number]] Resulted: 11/18/20    Specimen: Blood Updated: 07/18/21 1749      External RPR Non-Reactive    Rubella Antibody, IgG [[id number]] Resulted: 20    Specimen: Blood Updated: 21     External Rubella Qual Immune    Group B Streptococcus Culture - Swab, Vaginal/Rectum [[id number]] Resulted: 21    Specimen: Swab from Vaginal/Rectum Updated: 21     External Strep Group B Ag Negative             Assessment/Plan     Active Problems:    Pregnant         Assessment:  1.  Intrauterine pregnancy at 39w6d gestation with overall reassuring fetal status.    2.   Decels noted with movement and repositioning and while attempting AROM  3.  GBS status:   External Strep Group B Ag   Date Value Ref Range Status   2021 Negative  Final     4. FSR    Plan:  1. Primary  scheduled  2. We discussed that since she was having deep decels with minimal stimulation that we should proceed with CS. Pt and  discussed this and agree to proceed.       Mehnaz Fields MD   2021   07:58 EDT      Electronically signed by Mehnaz Fields MD at 21 0936          Operative/Procedure Notes (last 48 hours) (Notes from 21 1553 through 21 1553)      Mehnaz Fields MD at 21 1104          [facility]   Section Operative Note    Pre-Operative Dx:   1.  Patient is a 26 y.o.  at 39w6d    2. fetal distress      Post-Operative Dx:    1.  Same     Procedure: Primary    Surgeon:  Assistant: Mehnaz Fields MD   Anesthesia:  Anesthesiologist:  Marco A Carrizales     EBL:  800cc     Antibiotics: cefazolin (Ancef)     Infant    Findings: LVMI, Sincere  AB.385/3.1  VB.396/0       Apgars:    8  @ 1 minute /   9  @ 5 minutes          Procedure Details:     Pt was taken to the OR where she was prepped and draped in the usual sterile fashion with a catheter and a left tilt.  Anesthesia was found to be adequate.    A Pfannenstiel skin incision was made with the scalpel and carried through to the underlying layer of  fascia with the scalpel.  The fascial incision was extended laterally with the Lim scissors and  from the underlying rectus muscles superiorly and inferiorly.  The rectus muscles were  in the midline and the peritoneum was entered bluntly.  The peritoneal incision was extended laterally and the Clive retractor was placed.  The bladder flap was created sharply.    A low transverse uterine incision was made with the scalpel and extended manually.    The infant's head, shoulders, and body were delivered without difficulty.  Nose and mouth were bulb suctioned.  The cord was clamped and cut.  The infant was shown to the parents then handed to awaiting nurse with good cry, color, tone, and movement of all extremities.   Cord gasses and blood were obtained.   Placenta was delivered spontaneously intact with a three vessel cord.    The uterus was exteriorized and cleared of all clots and debris.    The uterine incision was repaired with 0 Monocryl in a running, locked fashion and a second layer of 0 Monocryl was used and excellent hemostasis was achieved.   The posterior cul de sac was irrigated.   The uterus was returned to the abdomen, the gutters were cleared of all clots and debris.  The uterine incision was examined and was hemostatic.     The peritoneum was reapproximated with 3.0 Monocryl in a running fashion.  The rectus muscles were reapproximated with 0 Monocryl in several simple interrupted sutures.    The fascia was closed with 0 Vicryl in a running fashion.    The subcutaneous space was irrigated and closed with 3.0 Monocryl.    The skin was closed with 4.0 vicryl in a subcuticular fashion.  Sponge, lap, needle and instrument counts were correct x 2.        Complications:   None      Disposition:   Recovery room then postpartum.          Mehnaz Fields MD  7/19/2021  11:04 EDT        Electronically signed by Mehnaz iFelds MD at 07/19/21 6964         Physician Progress Notes  (last 48 hours) (Notes from 07/17/21 1553 through 07/19/21 1553)    No notes of this type exist for this encounter.       Consult Notes (last 48 hours) (Notes from 07/17/21 1554 through 07/19/21 1554)    No notes of this type exist for this encounter.

## 2021-07-19 NOTE — H&P
[facility]  Obstetric History and Physical     Chief Complaint: IOL at term    Subjective     Patient is a 26 y.o. female  currently at 39w6d, who presents for IOL at term.     Her prenatal care is c/b anemia, abnormal 1 hour-unable to complete 3 hour. She checked glucose for 2 weeks with all normal values.      Prenatal Information:  External Prenatal Results     Pregnancy Outside Results - Transcribed From Office Records - See Scanned Records For Details     Test Value Date Time    ABO  A  21    Rh  Positive  21    Antibody Screen  Negative  21      ^ Negative  20     Varicella IgG       Rubella ^ Immune  20     Hgb  10.6 g/dL 21    Hct  32.1 % 21    Glucose Fasting GTT       Glucose Tolerance Test 1 hour       Glucose Tolerance Test 3 hour       Gonorrhea (discrete)       Chlamydia (discrete)       RPR ^ Non-Reactive  20     VDRL       Syphilis Antibody       HBsAg ^ Negative  20     Herpes Simplex Virus PCR       Herpes Simplex VIrus Culture       HIV ^ Non-Reactive  20     Hep C RNA Quant PCR       Hep C Antibody ^ neg  20     AFP       Group B Strep ^ Negative  21     GBS Susceptibility to Clindamycin       GBS Susceptibility to Erythromycin       Fetal Fibronectin       Genetic Testing, Maternal Blood             Drug Screening     Test Value Date Time    Urine Drug Screen       Amphetamine Screen       Barbiturate Screen       Benzodiazepine Screen       Methadone Screen       Phencyclidine Screen       Opiates Screen       THC Screen       Cocaine Screen       Propoxyphene Screen       Buprenorphine Screen       Methamphetamine Screen       Oxycodone Screen       Tricyclic Antidepressants Screen             Legend    ^: Historical                         Past OB History:    none       Past Medical History: Past Medical History:   Diagnosis Date   • Asthma     exercise induced   • Celiac disease    • Eczema     • Irritable bowel syndrome    • Yeast infection of the vagina         Past Surgical History Past Surgical History:   Procedure Laterality Date   • WISDOM TOOTH EXTRACTION  2009        Family History: No family history on file.   Social History:  reports that she has never smoked. She has never used smokeless tobacco.   reports previous alcohol use.   reports no history of drug use.        General ROS: Pertinent items are noted in HPI    Objective      Vitals:     Vitals:    07/19/21 0600 07/19/21 0630 07/19/21 0700 07/19/21 0721   BP: 118/73 113/71 120/77    Pulse: 85 82 76 72   Resp:  16 16    Temp:    98.2 °F (36.8 °C)   TempSrc:    Oral   SpO2:    99%   Weight:       Height:           Fetal Heart Rate Assessment:   140, mod variability; several decels to 60's-70's with cervidil and pitocin after getting up to the bathroom or repositioning in the bed    [place]:   Every 2-3 min     Physical Exam:     General Appearance:    Alert, cooperative, in no acute distress   Abdomen:     Soft, non-tender, EFW 7 lbs   Pelvic Exam:    Presentation: vtx    Cervix: was checked (by me): 1-2 cm / 50% % / -2, pelvis clinically adequate   Extremities:   Moves all extremities well   Skin:   No bleeding, bruising or rash         Laboratory Results:   Lab Results (last 48 hours)     Procedure Component Value Units Date/Time    COVID PRE-OP / PRE-PROCEDURE SCREENING ORDER (NO ISOLATION) - Swab, Nasopharynx [[id number]]  (Normal) Collected: 07/18/21 1854    Specimen: Swab from Nasopharynx Updated: 07/18/21 1937    Narrative:      The following orders were created for panel order COVID PRE-OP / PRE-PROCEDURE SCREENING ORDER (NO ISOLATION) - Swab, Nasopharynx.  Procedure                               Abnormality         Status                     ---------                               -----------         ------                     COVID-19,CEPHEID,COR/LUIS...[[id number]]  Normal              Final result                 Please view results  for these tests on the individual orders.    COVID-19,CEPHEID,COR/LUIS/PAD/SUSU IN-HOUSE(OR EMERGENT/ADD-ON),NP SWAB IN TRANSPORT MEDIA 3-4 HR TAT, RT-PCR - Swab, Nasopharynx [[id number]]  (Normal) Collected: 07/18/21 1854    Specimen: Swab from Nasopharynx Updated: 07/18/21 1937     COVID19 Not Detected    Narrative:      Fact sheet for providers: https://www.fda.gov/media/404356/download     Fact sheet for patients: https://www.fda.gov/media/492366/download  Fact sheet for providers: https://www.fda.gov/media/106847/download    Fact sheet for patients: https://www.[employer].gov/media/505470/download    Test performed by PCR.    CBC (No Diff) [[id number]]  (Abnormal) Collected: 07/18/21 1852    Specimen: Blood Updated: 07/18/21 1922     WBC 8.40 10*3/mm3      RBC 4.31 10*6/mm3      Hemoglobin 10.6 g/dL      Hematocrit 32.1 %      MCV 74.5 fL      MCH 24.6 pg      MCHC 33.0 g/dL      RDW 16.0 %      RDW-SD 42.0 fl      MPV 8.0 fL      Platelets 188 10*3/mm3     RPR [[id number]] Collected: 07/18/21 1852    Specimen: Blood Updated: 07/18/21 1915    HIV-1 Antibody, EIA [[id number]] Resulted: 11/18/20    Specimen: Blood Updated: 07/18/21 1749     External HIV Antibody Non-Reactive    Hepatitis C Antibody [[id number]] Resulted: 11/18/20    Specimen: Blood Updated: 07/18/21 1749     External Hepatitis C Ab neg    Hepatitis B Surface Antigen [[id number]] Resulted: 11/18/20    Specimen: Blood Updated: 07/18/21 1749     External Hepatitis B Surface Ag Negative    RPR [[id number]] Resulted: 11/18/20    Specimen: Blood Updated: 07/18/21 1749     External RPR Non-Reactive    Rubella Antibody, IgG [[id number]] Resulted: 11/18/20    Specimen: Blood Updated: 07/18/21 1749     External Rubella Qual Immune    Group B Streptococcus Culture - Swab, Vaginal/Rectum [[id number]] Resulted: 06/24/21    Specimen: Swab from Vaginal/Rectum Updated: 07/18/21 1749     External Strep Group B Ag Negative             Assessment/Plan     Active Problems:     Pregnant         Assessment:  1.  Intrauterine pregnancy at 39w6d gestation with overall reassuring fetal status.    2.   Decels noted with movement and repositioning and while attempting AROM  3.  GBS status:   External Strep Group B Ag   Date Value Ref Range Status   2021 Negative  Final     4. FSR    Plan:  1. Primary  scheduled  2. We discussed that since she was having deep decels with minimal stimulation that we should proceed with CS. Pt and  discussed this and agree to proceed.       Mehnaz Fields MD   2021   07:58 EDT

## 2021-07-19 NOTE — ANESTHESIA POSTPROCEDURE EVALUATION
Patient: Melissa Bridges    Procedure Summary     Date: 21 Room / Location: [facility] LABOR DELIVERY  [facility] LABOR DELIVERY    Anesthesia Start: 958 Anesthesia Stop:     Procedure:  SECTION PRIMARY (N/A Abdomen) Diagnosis: (fetal tolerance)    Surgeons: Mehnaz Fields MD Provider: Bryce Carrizales MD    Anesthesia Type: spinal ASA Status: 2          Anesthesia Type: spinal    Vitals  Vitals Value Taken Time   /61 21 1110   Temp 97.8 °F (36.6 °C) 21 1110   Pulse 80 21 1110   Resp 17 21 1110   SpO2 98 % 21 1110           Post Anesthesia Care and Evaluation    Patient location during evaluation: PACU  Patient participation: complete - patient participated  Level of consciousness: awake  Pain scale: See nurse's notes for pain score.  Pain management: adequate  Airway patency: patent  Anesthetic complications: No anesthetic complications  PONV Status: none  Cardiovascular status: acceptable  Respiratory status: acceptable  Hydration status: acceptable    Comments: Patient seen and examined postoperatively; vital signs stable; SpO2 greater than or equal to 90%; cardiopulmonary status stable; nausea/vomiting adequately controlled; pain adequately controlled; no apparent anesthesia complications; patient discharged from anesthesia care when discharge criteria were met

## 2021-07-19 NOTE — SIGNIFICANT NOTE
Monitored during spinal placement to abd prep with no decels noted, Moderate variability with accels noted.

## 2021-07-19 NOTE — ANESTHESIA PROCEDURE NOTES
Spinal Block      Patient reassessed immediately prior to procedure    Patient location during procedure: OR  Performed By  Anesthesiologist: Bryce Carrizales MD  Preanesthetic Checklist  Completed: patient identified, IV checked, site marked, risks and benefits discussed, surgical consent, monitors and equipment checked, pre-op evaluation and timeout performed  Spinal Block Prep:  Patient Position:sitting  Sterile Tech:sterile barriers, mask, gloves and cap  Prep:Betadine  Patient Monitoring:blood pressure monitoring, continuous pulse oximetry and EKG  Spinal Block Procedure  Approach:midline  Guidance:landmark technique and palpation technique  Location:L3-L4  Needle Type:Sprotte  Needle Gauge:25 G  Placement of Spinal needle event:cerebrospinal fluid aspirated  Paresthesia: no  Fluid Appearance:clear  Medications: Morphine PF injection, 0.25 mg  fentaNYL Citrate (PF) (SUBLIMAZE) injection, 15 mcg  bupivacaine PF (MARCAINE) injection 0.75%, 1.6 mL  Med Administered at 7/19/2021 10:12 AM   Post Assessment  Patient Tolerance:patient tolerated the procedure well with no apparent complications  Complications no

## 2021-07-20 LAB
BASOPHILS # BLD AUTO: 0 10*3/MM3 (ref 0–0.2)
BASOPHILS NFR BLD AUTO: 0.2 % (ref 0–1.5)
DEPRECATED RDW RBC AUTO: 43.3 FL (ref 37–54)
EOSINOPHIL # BLD AUTO: 0.2 10*3/MM3 (ref 0–0.4)
EOSINOPHIL NFR BLD AUTO: 1.2 % (ref 0.3–6.2)
ERYTHROCYTE [DISTWIDTH] IN BLOOD BY AUTOMATED COUNT: 16.2 % (ref 12.3–15.4)
HCT VFR BLD AUTO: 31.3 % (ref 34–46.6)
HGB BLD-MCNC: 10.2 G/DL (ref 12–15.9)
LAB AP CASE REPORT: NORMAL
LYMPHOCYTES # BLD AUTO: 1.4 10*3/MM3 (ref 0.7–3.1)
LYMPHOCYTES NFR BLD AUTO: 10.4 % (ref 19.6–45.3)
MCH RBC QN AUTO: 24.6 PG (ref 26.6–33)
MCHC RBC AUTO-ENTMCNC: 32.6 G/DL (ref 31.5–35.7)
MCV RBC AUTO: 75.4 FL (ref 79–97)
MONOCYTES # BLD AUTO: 0.9 10*3/MM3 (ref 0.1–0.9)
MONOCYTES NFR BLD AUTO: 6.4 % (ref 5–12)
NEUTROPHILS NFR BLD AUTO: 11.4 10*3/MM3 (ref 1.7–7)
NEUTROPHILS NFR BLD AUTO: 81.8 % (ref 42.7–76)
NRBC BLD AUTO-RTO: 0.1 /100 WBC (ref 0–0.2)
PATH REPORT.FINAL DX SPEC: NORMAL
PATH REPORT.GROSS SPEC: NORMAL
PLATELET # BLD AUTO: 160 10*3/MM3 (ref 140–450)
PMV BLD AUTO: 8 FL (ref 6–12)
RBC # BLD AUTO: 4.15 10*6/MM3 (ref 3.77–5.28)
WBC # BLD AUTO: 13.9 10*3/MM3 (ref 3.4–10.8)

## 2021-07-20 PROCEDURE — 85025 COMPLETE CBC W/AUTO DIFF WBC: CPT | Performed by: OBSTETRICS & GYNECOLOGY

## 2021-07-20 RX ORDER — DOCUSATE SODIUM 100 MG/1
100 CAPSULE, LIQUID FILLED ORAL 2 TIMES DAILY
Status: DISCONTINUED | OUTPATIENT
Start: 2021-07-20 | End: 2021-07-22 | Stop reason: HOSPADM

## 2021-07-20 RX ADMIN — OXYCODONE HYDROCHLORIDE 5 MG: 5 TABLET ORAL at 11:54

## 2021-07-20 RX ADMIN — MONTELUKAST 10 MG: 10 TABLET, FILM COATED ORAL at 18:49

## 2021-07-20 RX ADMIN — OXYCODONE HYDROCHLORIDE 10 MG: 5 TABLET ORAL at 18:49

## 2021-07-20 RX ADMIN — IBUPROFEN 600 MG: 600 TABLET, FILM COATED ORAL at 08:18

## 2021-07-20 RX ADMIN — DOCUSATE SODIUM 100 MG: 100 CAPSULE ORAL at 23:00

## 2021-07-20 RX ADMIN — PRENATAL VITAMINS-IRON FUMARATE 27 MG IRON-FOLIC ACID 0.8 MG TABLET 1 TABLET: at 08:18

## 2021-07-20 RX ADMIN — OXYCODONE HYDROCHLORIDE 10 MG: 5 TABLET ORAL at 01:55

## 2021-07-20 RX ADMIN — OXYCODONE HYDROCHLORIDE 10 MG: 5 TABLET ORAL at 23:01

## 2021-07-20 RX ADMIN — OXYCODONE HYDROCHLORIDE 5 MG: 5 TABLET ORAL at 08:18

## 2021-07-20 RX ADMIN — DOCUSATE SODIUM 100 MG: 100 CAPSULE ORAL at 11:50

## 2021-07-20 RX ADMIN — IBUPROFEN 600 MG: 600 TABLET, FILM COATED ORAL at 18:49

## 2021-07-20 NOTE — LACTATION NOTE
This note was copied from a baby's chart.  Pt denies hx of breast surgery, no allergy to wool, has allergy to some foods. Medela gel patches provided. Instructed on use.   She has a [employer] pump at home. Teaching done. Bf dvd watched. Reports recently fed, has been bd well. Mild nipple tenderness reported.   Nipple skin care products in use. Encouraged to call for feeding obs & for help as needed.

## 2021-07-20 NOTE — PROGRESS NOTES
DORIS Al[facility]  Postpartum Note    Subjective   Postpartum Day 1:  Primary Low Transverse  Section    Patient without complaints. Her pain is well controlled with nonsteroidal anti-inflammatory drugs and prescribed pain medications. She is ambulating well.  Patient describes her bleeding as thin lochia. Fadumo PO intake. No flatus yet. No dizziness or fatigue.    Breastfeeding: infant latching without difficulty.    Objective     Vitals:  Vitals:    21 1315 21 1900 21 2305 21 0246   BP: 113/69 112/68 108/67 100/63   BP Location: Right arm Right arm Right arm Left arm   Patient Position: Sitting Lying Lying Lying   Pulse: 78 84 71 72   Resp: 18 17 17 16   Temp: 97.9 °F (36.6 °C) 97.6 °F (36.4 °C) 97.4 °F (36.3 °C) 98.4 °F (36.9 °C)   TempSrc: Oral Oral Oral Oral   SpO2: 99% 97% 98% 98%   Weight:       Height:           Physical Exam:  General:  Alert and oriented x3. No acute distress.  Abdomen: abdomen is soft without significant tenderness, masses, organomegaly or guarding. Fundus: appropriate, firm, non tender  Incision: Bandage in Place  Skin: Warm, Dry  Extremities: Normal,  trace edema. Nontender     Labs:  Results from last 7 days   Lab Units 21  0424 21  1852   WBC 10*3/mm3 13.90* 8.40   HEMOGLOBIN g/dL 10.2* 10.6*   HEMATOCRIT % 31.3* 32.1*   PLATELETS 10*3/mm3 160 188            Feeding method: Breastfeeding Status: Yes     Blood Type: RH Positive        Assessment/Plan     Active Problems:    Pregnant      Melissa Bridges is Day 1  post-partum from a  Primary Low Transverse  Section      Plan:  routine, continue present management, encourage ambulation and monitor pain.       Roxana Barrientos NP  2021  08:28 EDT

## 2021-07-21 RX ADMIN — IBUPROFEN 600 MG: 600 TABLET, FILM COATED ORAL at 20:01

## 2021-07-21 RX ADMIN — OXYCODONE HYDROCHLORIDE 10 MG: 5 TABLET ORAL at 11:25

## 2021-07-21 RX ADMIN — OXYCODONE HYDROCHLORIDE 10 MG: 5 TABLET ORAL at 20:01

## 2021-07-21 RX ADMIN — OXYCODONE HYDROCHLORIDE 10 MG: 5 TABLET ORAL at 03:23

## 2021-07-21 RX ADMIN — IBUPROFEN 600 MG: 600 TABLET, FILM COATED ORAL at 07:07

## 2021-07-21 RX ADMIN — OXYCODONE HYDROCHLORIDE 10 MG: 5 TABLET ORAL at 16:02

## 2021-07-21 RX ADMIN — OXYCODONE HYDROCHLORIDE 10 MG: 5 TABLET ORAL at 23:58

## 2021-07-21 RX ADMIN — IBUPROFEN 600 MG: 600 TABLET, FILM COATED ORAL at 13:05

## 2021-07-21 RX ADMIN — MONTELUKAST 10 MG: 10 TABLET, FILM COATED ORAL at 16:02

## 2021-07-21 RX ADMIN — DOCUSATE SODIUM 100 MG: 100 CAPSULE ORAL at 20:01

## 2021-07-21 RX ADMIN — PRENATAL VITAMINS-IRON FUMARATE 27 MG IRON-FOLIC ACID 0.8 MG TABLET 1 TABLET: at 07:07

## 2021-07-21 RX ADMIN — OXYCODONE HYDROCHLORIDE 10 MG: 5 TABLET ORAL at 07:07

## 2021-07-21 RX ADMIN — DOCUSATE SODIUM 100 MG: 100 CAPSULE ORAL at 07:07

## 2021-07-21 RX ADMIN — IBUPROFEN 600 MG: 600 TABLET, FILM COATED ORAL at 01:11

## 2021-07-21 NOTE — PLAN OF CARE
Goal Outcome Evaluation:   Motrin and Sobeida for pain , tolerating well. Will cont to monitor

## 2021-07-21 NOTE — PROGRESS NOTES
DORIS Almonte  Postpartum Note    Subjective   Postpartum Day 2:  Primary Low Transverse  Section    Patient without complaints. Her pain is well controlled with nonsteroidal anti-inflammatory drugs and prescribed pain medications. She is ambulating well.  Patient describes her bleeding as thin lochia. No flatus yet. Fadumo PO intake.     Breastfeeding: infant latching without difficulty.    Objective     Vitals:  Vitals:    21 0246 21 0805 21 1515 21 0028   BP: 100/63 101/67 108/69 105/64   BP Location: Left arm Left arm Left arm Left arm   Patient Position: Lying Lying Sitting Lying   Pulse: 72 71 71 68   Resp: 16 16 18 17   Temp: 98.4 °F (36.9 °C) 98.5 °F (36.9 °C) 98.1 °F (36.7 °C) 98.4 °F (36.9 °C)   TempSrc: Oral Oral Oral Oral   SpO2: 98% 97% 99% 98%   Weight:       Height:           Physical Exam:  General:  Alert and oriented x3. No acute distress.  Abdomen: abdomen is soft without significant tenderness, masses, organomegaly or guarding. Fundus: appropriate, firm, non tender  Incision: Clean/Dry/Intact  Skin: Warm, Dry  Extremities: Normal,  trace edema. Nontender     Labs:  Results from last 7 days   Lab Units 21  0424 21  1852   WBC 10*3/mm3 13.90* 8.40   HEMOGLOBIN g/dL 10.2* 10.6*   HEMATOCRIT % 31.3* 32.1*   PLATELETS 10*3/mm3 160 188            Feeding method: Breastfeeding Status: Yes     Blood Type: RH Positive        Assessment/Plan     Active Problems:    Pregnant      Melissa Bridges is Day 2  post-partum from a  Primary Low Transverse  Section      Plan:  routine, continue present management, encourage ambulation, monitor pain and plan d/c tomorrow.       Roxana Barrientos NP  2021  08:27 EDT

## 2021-07-21 NOTE — PLAN OF CARE
Goal Outcome Evaluation:  Plan of Care Reviewed With: patient        Progress: improving  Outcome Summary: pt taking prn motrin and roxycodone for pain. up ad kamaljit. voiding qs. breastfeeding well. will cont to monitor

## 2021-07-21 NOTE — LACTATION NOTE
This note was copied from a baby's chart.  Pt visited, assisted to diaper infant & attempt bf, baby not interested at this time.   Skin to skin encouraged and done. Will call for help as needed.

## 2021-07-22 VITALS
HEIGHT: 64 IN | SYSTOLIC BLOOD PRESSURE: 111 MMHG | BODY MASS INDEX: 31.8 KG/M2 | TEMPERATURE: 97.4 F | OXYGEN SATURATION: 98 % | RESPIRATION RATE: 18 BRPM | HEART RATE: 79 BPM | WEIGHT: 186.29 LBS | DIASTOLIC BLOOD PRESSURE: 74 MMHG

## 2021-07-22 RX ORDER — OXYCODONE HYDROCHLORIDE 5 MG/1
5 TABLET ORAL EVERY 4 HOURS PRN
Qty: 20 TABLET | Refills: 0 | Status: SHIPPED | OUTPATIENT
Start: 2021-07-22 | End: 2021-07-26

## 2021-07-22 RX ORDER — IBUPROFEN 600 MG/1
600 TABLET ORAL EVERY 6 HOURS PRN
Qty: 30 TABLET | Refills: 0 | Status: SHIPPED | OUTPATIENT
Start: 2021-07-22

## 2021-07-22 RX ADMIN — OXYCODONE HYDROCHLORIDE 10 MG: 5 TABLET ORAL at 03:49

## 2021-07-22 RX ADMIN — OXYCODONE HYDROCHLORIDE 10 MG: 5 TABLET ORAL at 12:20

## 2021-07-22 RX ADMIN — IBUPROFEN 600 MG: 600 TABLET, FILM COATED ORAL at 01:54

## 2021-07-22 RX ADMIN — OXYCODONE HYDROCHLORIDE 10 MG: 5 TABLET ORAL at 08:16

## 2021-07-22 RX ADMIN — DOCUSATE SODIUM 100 MG: 100 CAPSULE ORAL at 09:32

## 2021-07-22 RX ADMIN — PRENATAL VITAMINS-IRON FUMARATE 27 MG IRON-FOLIC ACID 0.8 MG TABLET 1 TABLET: at 09:32

## 2021-07-22 RX ADMIN — IBUPROFEN 600 MG: 600 TABLET, FILM COATED ORAL at 08:16

## 2021-07-22 NOTE — LACTATION NOTE
This note was copied from a baby's chart.  Pt visited, states breastfeeding continues to get easier, nipple tenderness decreasing, nipple skin care products in use.  Breasts starting to fill. Teaching complete. Plans dc today, will follow up as needed.

## 2021-07-22 NOTE — PLAN OF CARE
Goal Outcome Evaluation:               Patient voiding adequately and paitent's pain controlled with ibuprofen and oxycodone at this time. Patient discharged home with baby.

## 2021-07-22 NOTE — DISCHARGE SUMMARY
[facility]  Delivery Discharge Summary    Primary OB Clinician: Mehnaz Fields MD    Admission Diagnosis: TIUP; Fetal Distress  Active Problems:    Pregnant      Discharge Diagnosis:  delivered    Gestational Age: 39w6d    Date of Delivery: 2021     Delivered By:  Mehnaz Fields     Delivery Type: , Low Transverse      Tubal Ligation: n/a    Intrapartum Course: Uncomplicated delivery.     Postpartum Course:  Pt was admitted and underwent  Primary Low Transverse  Section. Pt was transferred to PP where she had an uncomplicated course. Pt remained AFVSS, had scant lochia and pain was well controlled. Pt d/c home in stable condition and will f/u in office for PP visit as scheduled or PRN. Currently breastfeeding. Plans on condoms  for contraception.     Physical Exam:    Vitals:   Vitals:    21 0745 21 1617 21 2311 21 0934   BP: 97/65 114/72 110/71 111/74   BP Location: Left arm  Left arm Right arm   Patient Position: Sitting  Lying Sitting   Pulse: 67 74 88 79   Resp: 16 16 17 18   Temp: 97.5 °F (36.4 °C) 97.1 °F (36.2 °C) 97.9 °F (36.6 °C) 97.4 °F (36.3 °C)   TempSrc: Oral Oral Oral Oral   SpO2: 96% 98% 99% 98%   Weight:       Height:         Temp (24hrs), Av.5 °F (36.4 °C), Min:97.1 °F (36.2 °C), Max:97.9 °F (36.6 °C)      General Appearance:    Alert, cooperative, in no acute distress   Abdomen:     Soft non-tender, non-distended, no guarding, no rebound         tenderness.   Extremities:   Moves all extremities well, no edema, no cyanosis, no              Redness.   Incision:  Clean/Dry/Intact and Sutures intact   Fundus:   Firm, below umbilicus     Feeding method: Breastfeeding Status: Yes    Labs:  Results from last 7 days   Lab Units 21  0424 21  1852   WBC 10*3/mm3 13.90* 8.40   HEMOGLOBIN g/dL 10.2* 10.6*   HEMATOCRIT % 31.3* 32.1*   PLATELETS 10*3/mm3 160 188           Blood Type: RH Positive      Plan:  Discharge to home.    Follow-up  appointment with Dr Fields in 6 weeks.    Violeta Etienne, APRMELISSA  7/22/2021  12:15 EDT

## 2021-07-22 NOTE — PLAN OF CARE
Goal Outcome Evaluation:  Plan of Care Reviewed With: patient        Progress: improving  Outcome Summary: pt taking motrin and roxycodone prn for pain. breastfeeding well. will cont to monitor

## 2021-07-23 NOTE — SIGNIFICANT NOTE
Case Management Discharge Note                Selected Continued Care - Discharged on 7/22/2021 Admission date: 7/18/2021 - Discharge disposition: Home or Self Care                   Final Discharge Disposition Code: (P) 01 - home or self-care

## 2021-07-23 NOTE — PAYOR COMM NOTE
"This is discharge notice for Melissa Marmolejo  Reference/Auth # [id number]  Pt discharged routine to home on 7/22/21.    Shayna Hendrickson, RN, BSN  Utilization Review Nurse  [facility]  Direct & confidential phone # [phone]  Fax # [phone]      Melissa Marmolejo (26 y.o. Female)     Date of Birth Social Security Number Address Home Phone MRN    [id number]  [address] [place] [address]  [place] [address] [phone] [id number]    Shinto Marital Status          None        Admission Date Admission Type Admitting Provider Attending Provider Department, Room/Bed    7/18/21 Elective Mehnaz Fields MD  [facility] MOTHER BABY, MOVR/MOVR    Discharge Date Discharge Disposition Discharge Destination        7/22/2021 Home or Self Care              Attending Provider: (none)   Allergies: Amoxicillin, Gluten Meal, Lactose Intolerance (Gi), Milk-related Compounds    Isolation: None   Infection: None   Code Status: Prior    Ht: 162.6 cm (64\")   Wt: 84.5 kg (186 lb 4.6 oz)    Admission Cmt: None   Principal Problem: None                Active Insurance as of 7/18/2021     Primary Coverage     Payor Plan Insurance Group Employer/Plan Group    [facility] [employer] [facility] [employer] [facility] PPO [id number]     Payor Plan Address Payor Plan Phone Number Payor Plan Fax Number Effective Dates    [address] [phone]  2/1/2016 - None Entered    [place] [address]       Subscriber Name Subscriber Birth Date Member ID       MELISA MARMOLEJO 1/18/1993 [id number]                 Emergency Contacts      (Rel.) Home Phone Work Phone Mobile Phone    melisa marmolejo (Spouse) -- -- [phone]               Discharge Summary      Violeta Etienne APRN at 07/22/21 1215          [facility]  Delivery Discharge Summary    Primary OB Clinician: Mehnaz Fields MD    Admission Diagnosis: TIUP; Fetal Distress  Active Problems:    " Pregnant      Discharge Diagnosis:  delivered    Gestational Age: 39w6d    Date of Delivery: 2021     Delivered By:  Mehnaz Fields     Delivery Type: , Low Transverse      Tubal Ligation: n/a    Intrapartum Course: Uncomplicated delivery.     Postpartum Course:  Pt was admitted and underwent  Primary Low Transverse  Section. Pt was transferred to PP where she had an uncomplicated course. Pt remained AFVSS, had scant lochia and pain was well controlled. Pt d/c home in stable condition and will f/u in office for PP visit as scheduled or PRN. Currently breastfeeding. Plans on condoms  for contraception.     Physical Exam:    Vitals:   Vitals:    21 0745 21 1617 21 2311 21 0934   BP: 97/65 114/72 110/71 111/74   BP Location: Left arm  Left arm Right arm   Patient Position: Sitting  Lying Sitting   Pulse: 67 74 88 79   Resp: 16 16 17 18   Temp: 97.5 °F (36.4 °C) 97.1 °F (36.2 °C) 97.9 °F (36.6 °C) 97.4 °F (36.3 °C)   TempSrc: Oral Oral Oral Oral   SpO2: 96% 98% 99% 98%   Weight:       Height:         Temp (24hrs), Av.5 °F (36.4 °C), Min:97.1 °F (36.2 °C), Max:97.9 °F (36.6 °C)      General Appearance:    Alert, cooperative, in no acute distress   Abdomen:     Soft non-tender, non-distended, no guarding, no rebound         tenderness.   Extremities:   Moves all extremities well, no edema, no cyanosis, no              Redness.   Incision:  Clean/Dry/Intact and Sutures intact   Fundus:   Firm, below umbilicus     Feeding method: Breastfeeding Status: Yes    Labs:  Results from last 7 days   Lab Units 21  0424 21  1852   WBC 10*3/mm3 13.90* 8.40   HEMOGLOBIN g/dL 10.2* 10.6*   HEMATOCRIT % 31.3* 32.1*   PLATELETS 10*3/mm3 160 188           Blood Type: RH Positive      Plan:  Discharge to home.    Follow-up appointment with Dr Fields in 6 weeks.    MADONNA Orosco  2021  12:15 EDT      Electronically signed by Violeta Etienne APRN at 21  1218

## (undated) DEVICE — VIOLET BRAIDED (POLYGLACTIN 910), SYNTHETIC ABSORBABLE SUTURE: Brand: COATED VICRYL

## (undated) DEVICE — SOL IRRIG H2O 1000ML STRL

## (undated) DEVICE — GLV SURG SENSICARE PI MIC PF SZ6.5 LF STRL

## (undated) DEVICE — SOL IRRIG NACL 9PCT 1000ML BTL

## (undated) DEVICE — TRY SADDLE BLCK 25G

## (undated) DEVICE — SPNG LAP PREWSH SFTPK 18X18IN STRL PK/5

## (undated) DEVICE — SUT MNCRYL 3/0 CT1 36 IN Y944H

## (undated) DEVICE — PK C SECT 50

## (undated) DEVICE — DRSNG WND BORDR/ADHS NONADHR/GZ LF 4X10IN STRL

## (undated) DEVICE — SUT VIC 3/0 SH 27IN J416H